# Patient Record
Sex: FEMALE | Race: WHITE | Employment: OTHER
[De-identification: names, ages, dates, MRNs, and addresses within clinical notes are randomized per-mention and may not be internally consistent; named-entity substitution may affect disease eponyms.]

---

## 2022-03-19 PROBLEM — N28.1 RENAL CYST: Status: ACTIVE | Noted: 2018-06-04

## 2023-07-20 ENCOUNTER — HOSPITAL ENCOUNTER (OUTPATIENT)
Facility: HOSPITAL | Age: 83
Setting detail: RECURRING SERIES
Discharge: HOME | End: 2023-07-23
Payer: MEDICARE

## 2023-07-20 PROCEDURE — 97110 THERAPEUTIC EXERCISES: CPT

## 2023-07-20 PROCEDURE — 97535 SELF CARE MNGMENT TRAINING: CPT

## 2023-07-20 PROCEDURE — 97162 PT EVAL MOD COMPLEX 30 MIN: CPT

## 2023-07-20 NOTE — THERAPY EVALUATION
improve quality of life and return patient to maximum level of available prior function. OBJECTIVE:  ANKLE:  Presenting in Left lace-up AFO brace and running shoes. Ankle: Non-tender to palpation. AROM:   DF: L: 15 deg, R: 7 deg  PF: L: 52 deg, R: 56 deg  INV: L: 15 deg, R: 16 deg  EV: L: 10 deg, R: 8 deg    Strength MMT   INV: L: 5/5, R: 5/5  EV: L: 5/5, R: 5/5  PF, 5/5 global  DF: 5/5 global    SHOULDER:  FHRS, kyphotic upper t/s. Non-tender to palpation  AROM:  Flexion: WFL bilaterally  ABD: L: \"catch\" in shoulder + Painful Arc, 120 deg, R: 165 deg  MALLIKA: T3 bilaterally  FIR: T6 bilaterally    Strength MMT  Flexion: L: 4+/5, R: 4-/5  ABD: L: 3/5 P!, R: 4-/5  ER: L: 4-/5, R: 4+/5  IR: L: 5/5, R: 5/5    Evaluation Complexity:  History:  MEDIUM  Complexity : 1-2 comorbidities / personal factors will impact the outcome/ POC ; Examination:  HIGH Complexity : 4+ Standardized tests and measures addressing body structure, function, activity limitation and / or participation in recreation  ;Presentation:  MEDIUM Complexity : Evolving with changing characteristics  ; Clinical Decision Making:  MEDIUM Complexity : FOTO score of 26-74 FOTO score = an established functional score where 100 = no disability  Overall Complexity Rating: MEDIUM  Problem List: pain affecting function, decrease ROM, decrease strength, impaired gait/balance, decrease ADL/functional abilities, and decrease activity tolerance   Treatment Plan may include any combination of the followin Therapeutic Exercise, 46824 Neuromuscular Re-Education, 67657 Manual Therapy, 50062 Therapeutic Activity, 30165 Self Care/Home Management, 28848 Electrical Stim unattended, [id number] Electrical Stim attended, 99484 Vasopneumatic Device, [id number] Aquatic Therapy, [id number] Gait Training, [id number] Ultrasound, [id number] Mechanical Traction, [id number] Canalith Repositioning, [address] Orthotic Management and Training, and Other:   Patient / Family readiness to learn indicated by: asking

## 2023-07-20 NOTE — PROGRESS NOTES
PHYSICAL THERAPY - DAILY TREATMENT NOTE    Patient Name: Naz Rollins    Date: 2023    : 1940  Insurance: Payor: Inis Bar / Plan: Inis Bar / Product Type: *No Product type* /      Patient  verified YES   Visit #   Current / Total 1 8   Time   In / Out 1:40 2:20   Pain   In / Out 0-3 0-3   Subjective Functional Status/Changes: SEE EVALUATION     TREATMENT AREA =  M79.672  LEFT FOOT PAIN  and M25.512  LEFT SHOULDER PAIN     OBJECTIVE        Therapeutic Procedures: Tx Min Billable or 1:1 Min (if diff from Tx Min) Procedure, Rationale, Specifics   10  42720 Therapeutic Exercise (timed):  increase ROM, strength, coordination, balance, and proprioception to improve patient's ability to progress to PLOF and address remaining functional goals. (see flow sheet as applicable)     Details if applicable:       15  78164 Self Care/Home Management (timed):  improve patient knowledge and understanding of pain reducing techniques, positioning, diagnosis/prognosis, and physical therapy expectations, procedures and progression  to improve patient's ability to progress to PLOF and address remaining functional goals.   (see flow sheet as applicable)     Details if applicable:            Details if applicable:            Details if applicable:            Details if applicable:     25 36 [facility] Totals Reminder: bill using total billable min of TIMED therapeutic procedures (example: do not include dry needle or estim unattended, both untimed codes, in totals to left)  8-22 min = 1 unit; 23-37 min = 2 units; 38-52 min = 3 units; 53-67 min = 4 units; 68-82 min = 5 units   Total Total     [x]  Patient Education billed concurrently with other procedures   [x] Review HEP    [] Progressed/Changed HEP, detail:    [] Other detail:       Objective Information/Functional Measures/Assessment    SEE EVALUATION    Patient will benefit from skilled PT services to address functional deficits and attain remaining goals as

## 2023-08-03 ENCOUNTER — HOSPITAL ENCOUNTER (OUTPATIENT)
Facility: HOSPITAL | Age: 83
Setting detail: RECURRING SERIES
Discharge: HOME | End: 2023-08-06
Payer: MEDICARE

## 2023-08-03 PROCEDURE — 97110 THERAPEUTIC EXERCISES: CPT

## 2023-08-03 NOTE — PROGRESS NOTES
PHYSICAL THERAPY - DAILY TREATMENT NOTE    Patient Name: Michelle Bagley    Date: 8/3/2023    : 1940  Insurance: Payor: Kailey Alaniz / Plan: Kailey Alaniz / Product Type: *No Product type* /      Patient  verified YES   Visit #   Current / Total 2 8   Time   In / Out 10:20 11:00   Pain   In / Out 3-4 2-3   Subjective Functional Status/Changes: Says she broght her ankle brace just in case. Says the left shoulder is a 3-4/10. The ankle is feeling ok. She will try to mow the grass later today and see how it goes. TREATMENT AREA =  Left shoulder pain [M25.512]  Left foot pain [M79.672]    OBJECTIVE        Therapeutic Procedures: Tx Min Billable or 1:1 Min (if diff from Tx Min) Procedure, Rationale, Specifics   40  26725 Therapeutic Exercise (timed):  increase ROM, strength, coordination, balance, and proprioception to improve patient's ability to progress to PLOF and address remaining functional goals. (see flow sheet as applicable)     Details if applicable:              Details if applicable:            Details if applicable:            Details if applicable:            Details if applicable:     36  [facility] Totals Reminder: bill using total billable min of TIMED therapeutic procedures (example: do not include dry needle or estim unattended, both untimed codes, in totals to left)  8-22 min = 1 unit; 23-37 min = 2 units; 38-52 min = 3 units; 53-67 min = 4 units; 68-82 min = 5 units   Total Total     [x]  Patient Education billed concurrently with other procedures   [x] Review HEP    [] Progressed/Changed HEP, detail:    [] Other detail:   Access Code: [email or web address]  URL: [email or web address]  Date: 2023  Prepared by: Qasim Anderson    Exercises  - Standing Heel Raise with Support  - 1 x daily - 7 x weekly - 3 sets - 10 reps  - Standing Toe Raises at Chair  - 1 x daily - 7 x weekly - 3 sets - 10 reps  - Gastroc Stretch on Wall  - 1 x daily - 7 x weekly - 3 sets - 1 reps

## 2023-08-08 ENCOUNTER — HOSPITAL ENCOUNTER (OUTPATIENT)
Facility: HOSPITAL | Age: 83
Setting detail: RECURRING SERIES
Discharge: HOME | End: 2023-08-11
Payer: MEDICARE

## 2023-08-08 PROCEDURE — 97110 THERAPEUTIC EXERCISES: CPT

## 2023-08-08 NOTE — PROGRESS NOTES
PHYSICAL THERAPY - DAILY TREATMENT NOTE    Patient Name: Elvin Knott    Date: 2023    : 1940  Insurance: Payor: Danika Villarreal / Plan: Danika Villarreal / Product Type: *No Product type* /      Patient  verified YES   Visit #   Current / Total 3 8   Time   In / Out 12:20 1:00   Pain   In / Out 2 0   Subjective Functional Status/Changes: Says she did not get to do the HEP bc she had 3 funerals last week. TREATMENT AREA =  Left shoulder pain [M25.512]  Left foot pain [M79.672]    OBJECTIVE        Therapeutic Procedures: Tx Min Billable or 1:1 Min (if diff from Tx Min) Procedure, Rationale, Specifics   40  77390 Therapeutic Exercise (timed):  increase ROM, strength, coordination, balance, and proprioception to improve patient's ability to progress to PLOF and address remaining functional goals. (see flow sheet as applicable)     Details if applicable:              Details if applicable:            Details if applicable:            Details if applicable:            Details if applicable:     36  [facility] Totals Reminder: bill using total billable min of TIMED therapeutic procedures (example: do not include dry needle or estim unattended, both untimed codes, in totals to left)  8-22 min = 1 unit; 23-37 min = 2 units; 38-52 min = 3 units; 53-67 min = 4 units; 68-82 min = 5 units   Total Total     [x]  Patient Education billed concurrently with other procedures   [x] Review HEP    [] Progressed/Changed HEP, detail:    [] Other detail:   Access Code: [email or web address]  URL: [email or web address]  Date: 2023  Prepared by: Jah Bonilla    Objective Information/Functional Measures/Assessment    Pt taken through continuation of shoulder and ankle drills without complication today. Added loads without complication.      Patient will continue to benefit from skilled PT services to modify and progress therapeutic interventions, analyze and address functional mobility deficits, analyze

## 2023-08-10 ENCOUNTER — HOSPITAL ENCOUNTER (OUTPATIENT)
Facility: HOSPITAL | Age: 83
Setting detail: RECURRING SERIES
Discharge: HOME | End: 2023-08-13
Payer: MEDICARE

## 2023-08-10 PROCEDURE — 97110 THERAPEUTIC EXERCISES: CPT

## 2023-08-10 NOTE — PROGRESS NOTES
PHYSICAL THERAPY - DAILY TREATMENT NOTE    Patient Name: Bin Epps    Date: 8/10/2023    : 1940  Insurance: Payor: Raudelor Parcel / Plan: Lenor Parcel / Product Type: *No Product type* /      Patient  verified YES   Visit #   Current / Total 4 8   Time   In / Out 1:00 1:30   Pain   In / Out 0 0   Subjective Functional Status/Changes: Says she weeded yesterday and she felt good. Shoulder did not stop her from doing anything. Denies post session soreness or night pain. TREATMENT AREA =  Left shoulder pain [M25.512]  Left foot pain [M79.672]    OBJECTIVE        Therapeutic Procedures: Tx Min Billable or 1:1 Min (if diff from Tx Min) Procedure, Rationale, Specifics   30  20132 Therapeutic Exercise (timed):  increase ROM, strength, coordination, balance, and proprioception to improve patient's ability to progress to PLOF and address remaining functional goals. (see flow sheet as applicable)     Details if applicable:              Details if applicable:            Details if applicable:            Details if applicable:            Details if applicable:     27  [facility] Totals Reminder: bill using total billable min of TIMED therapeutic procedures (example: do not include dry needle or estim unattended, both untimed codes, in totals to left)  8-22 min = 1 unit; 23-37 min = 2 units; 38-52 min = 3 units; 53-67 min = 4 units; 68-82 min = 5 units   Total Total     [x]  Patient Education billed concurrently with other procedures   [x] Review HEP    [] Progressed/Changed HEP, detail:    [] Other detail:   Access Code: [email or web address]  URL: [email or web address]  Date: 2023  Prepared by: Sheila Curiel    Objective Information/Functional Measures/Assessment    Continued to progress resistance in clinic without complication.      Patient will continue to benefit from skilled PT services to modify and progress therapeutic interventions, analyze and address functional mobility

## 2023-08-15 ENCOUNTER — HOSPITAL ENCOUNTER (OUTPATIENT)
Facility: HOSPITAL | Age: 83
Setting detail: RECURRING SERIES
Discharge: HOME | End: 2023-08-18
Payer: MEDICARE

## 2023-08-15 PROCEDURE — 97140 MANUAL THERAPY 1/> REGIONS: CPT

## 2023-08-15 PROCEDURE — 97110 THERAPEUTIC EXERCISES: CPT

## 2023-08-15 NOTE — PROGRESS NOTES
Review HEP    [] Progressed/Changed HEP, detail:    [] Other detail:   Access Code: [email or web address]  URL: [email or web address]  Date: 08/03/2023  Prepared by: Brie Herrera    Objective Information/Functional Measures/Assessment    Did not add any drills or loads today. Pt denies complications with her home chores and ADLs at this time. We are up for PN NV and will address pt's goals and status. Patient will continue to benefit from skilled PT services to modify and progress therapeutic interventions, analyze and address functional mobility deficits, analyze and address ROM deficits, analyze and address strength deficits, analyze and address soft tissue restrictions, analyze and cue for proper movement patterns, and analyze and modify for postural abnormalities to address functional deficits and attain remaining goals. Progress toward goals / Updated goals:  []  See Progress Note/Recertification    Short Term Goals: To be accomplished in 2 weeks  Pt to report adherence and demonstrate compliance with basic HEP to allow progress between visits  Status at last Eval: Initiated  Current Status: Expanded (8/3/23)  Goal Met?  -  2. Pt to report SHOULDER pain <3/10 at worst to allow improved function and QOL  Status at last Eval: 5/10 with reaching  Current Status: -  Goal Met?  -  3. Pt to report no need for the ankle brace in most activity without increase in pains to ANKLE to allow improved healing and QOL   Status at last Eval: Using lace-up brace and some discomfort without it  Current Status: -  Goal Met?  -     Long Term Goals: To be accomplished in 4 weeks  Pt to improve FOTO score to 68/100 indicating improved function in daily tasks  Status at last Eval: 62/100  Current Status: -  Goal Met?  -  2. Pt to indicate a Global Rating Of Change (GROC) of 4+ indicating \"moderately better\"  Status at last Eval: n/a  Current Status: -  Goal Met?  -  3.  Pt to demonstrate Left SHOULDER ABD  AROM

## 2023-08-17 ENCOUNTER — HOSPITAL ENCOUNTER (OUTPATIENT)
Facility: HOSPITAL | Age: 83
Setting detail: RECURRING SERIES
Discharge: HOME | End: 2023-08-20
Payer: MEDICARE

## 2023-08-17 PROCEDURE — 97535 SELF CARE MNGMENT TRAINING: CPT

## 2023-08-17 PROCEDURE — 97140 MANUAL THERAPY 1/> REGIONS: CPT

## 2023-08-17 PROCEDURE — 97110 THERAPEUTIC EXERCISES: CPT

## 2023-08-17 NOTE — PROGRESS NOTES
goals:  []  See Progress Note/Recertification    Short Term Goals: To be accomplished in 2 weeks  Pt to report adherence and demonstrate compliance with basic HEP to allow progress between visits  Status at last Eval: Initiated  Current Status: Expanded (8/3/23)  Goal Met? MET  2. Pt to report SHOULDER pain <3/10 at worst to allow improved function and QOL  Status at last Eval: 5/10 with reaching  Current Status: 3-4/10 with reaching  Goal Met? Progressing (8/17/23)  3. Pt to report no need for the ankle brace in most activity without increase in pains to ANKLE to allow improved healing and QOL   Status at last Eval: Using lace-up brace and some discomfort without it  Current Status: Not using in home, using for community activity, or mowing the grass  Goal Met? Progressing well (8/17/23)     Long Term Goals: To be accomplished in 4 weeks  Pt to improve FOTO score to 68/100 indicating improved function in daily tasks  Status at last Eval: 62/100 (for foot, yet this is not patient's primary concern. DC this FOOT FOTO)  Current Status: DC this Foot FOTO  Goal Met? DC  2. Pt to indicate a Global Rating Of Change (GROC) of 4+ indicating \"moderately better\"  Status at last Eval: n/a  Current Status: -  Goal Met?  -  3. Pt to demonstrate Left SHOULDER ABD  AROM to 145 deg without painful arc to indicate return to PLOF  Status at last Eval: 120 deg ABD Painful Arc and \"catch\"  Current Status: No Change  Goal Met? Not Met (8/17/23)  4.  Pt to report improvement in SHOULDER FOTO to 69/100 to indicate improved function  Status at PN (8/17/23): 61/100  Current Status:  Goals Met:    PLAN  YES Continue plan of care  [x]  Upgrade activities as tolerated  []  Discharge due to :  []  Other:    Zion Ramos PT DPT, OCS   8/17/2023    1:02 PM    Future Appointments   Date Time Provider [address]  [facility]   8/21/2023 11:00 AM Zion Ramos PT [address] [facility]   8/24/2023 11:00 AM Zion Ramos PT [address] [facility]

## 2023-08-17 NOTE — THERAPY RECERTIFICATION
parties agreed to [address] [employer] [address]. Short Term Goals: To be accomplished in 2 weeks  Pt to report adherence and demonstrate compliance with basic HEP to allow progress between visits  Status at last Eval: Initiated  Current Status: Expanded (8/3/23)  Goal Met? MET  2. Pt to report SHOULDER pain <3/10 at worst to allow improved function and QOL  Status at last Eval: 5/10 with reaching  Current Status: 3-4/10 with reaching  Goal Met? Progressing (8/17/23)  3. Pt to report no need for the ankle brace in most activity without increase in pains to ANKLE to allow improved healing and QOL   Status at last Eval: Using lace-up brace and some discomfort without it  Current Status: Not using in home, using for community activity, or mowing the grass  Goal Met? Progressing well (8/17/23)     Long Term Goals: To be accomplished in 4 weeks  Pt to improve FOTO score to 68/100 indicating improved function in daily tasks  Status at last Eval: 62/100 (for foot, yet this is not patient's primary concern. DC this FOOT FOTO)  Current Status: DC this Foot FOTO  Goal Met? DC  2. Pt to indicate a Global Rating Of Change (GROC) of 4+ indicating \"moderately better\"  Status at last Eval: n/a  Current Status: -  Goal Met?  -  3. Pt to demonstrate Left SHOULDER ABD  AROM to 145 deg without painful arc to indicate return to PLOF  Status at last Eval: 120 deg ABD Painful Arc and \"catch\"  Current Status: No Change  Goal Met? Not Met (8/17/23)  4. Pt to report improvement in SHOULDER FOTO to 69/100 to indicate improved function  Status at PN (8/17/23): 61/100  Current Status:  Goals Met:    RECOMMENDATIONS  Continue current progressive POC as above  6 more visits    Certification Period: 7/20/23 - 10/18/23  Reporting Period (date from last assessment to current assessment): 7/20/23 - 8/17/23    If you have any questions/comments please contact us directly at ([phone]. Thank you for allowing us to assist in the care of your patient.     Shantal Turcios

## 2023-08-21 ENCOUNTER — APPOINTMENT (OUTPATIENT)
Facility: HOSPITAL | Age: 83
End: 2023-08-21
Payer: MEDICARE

## 2023-08-22 ENCOUNTER — HOSPITAL ENCOUNTER (OUTPATIENT)
Facility: HOSPITAL | Age: 83
Setting detail: RECURRING SERIES
End: 2023-08-22
Payer: MEDICARE

## 2023-08-24 ENCOUNTER — HOSPITAL ENCOUNTER (OUTPATIENT)
Facility: HOSPITAL | Age: 83
Setting detail: RECURRING SERIES
Discharge: HOME | End: 2023-08-27
Payer: MEDICARE

## 2023-08-24 PROCEDURE — 97530 THERAPEUTIC ACTIVITIES: CPT

## 2023-08-24 PROCEDURE — 97110 THERAPEUTIC EXERCISES: CPT

## 2023-08-24 NOTE — PROGRESS NOTES
PHYSICAL THERAPY - DAILY TREATMENT NOTE    Patient Name: Crystal Marr    Date: 2023    : 1940  Insurance: Payor: Maribel Wade / Plan: Maribel Wade / Product Type: *No Product type* /      Patient  verified YES   Visit #   Current / Total 7 12   Time   In / Out 11:00 11:40   Pain   In / Out 0 0   Subjective Functional Status/Changes:   Says she worked in the yard yesterday and she feels good today. Says still hurts to reach up and out. TREATMENT AREA =  Left shoulder pain [M25.512]  Left foot pain [M79.672]    OBJECTIVE    Therapeutic Procedures: Tx Min Billable or 1:1 Min (if diff from Tx Min) Procedure, Rationale, Specifics   30  60053 Therapeutic Exercise (timed):  increase ROM, strength, coordination, balance, and proprioception to improve patient's ability to progress to PLOF and address remaining functional goals. (see flow sheet as applicable)     Details if applicable:          83023 Manual Therapy (timed):  decrease pain, increase ROM, increase tissue extensibility, and increase postural awareness to improve patient's ability to progress to PLOF and address remaining functional goals. The manual therapy interventions were performed at a separate and distinct time from the therapeutic activities interventions . (see flow sheet as applicable)       Details if applicable:  Supine: Left shoulder DTM holds to infraspinatus x5. Left shoulder Prom ABD   10   00500 Therapeutic Activity (timed):  use of dynamic activities replicating functional movements to increase ROM, strength, coordination, balance, and proprioception in order to improve patient's ability to progress to PLOF and address remaining functional goals.   (see flow sheet as applicable)     Details if applicable:            Details if applicable:            Details if applicable:     36  [facility] Totals Reminder: bill using total billable min of TIMED therapeutic procedures (example: do not include dry needle or estim

## 2023-08-29 ENCOUNTER — HOSPITAL ENCOUNTER (OUTPATIENT)
Facility: HOSPITAL | Age: 83
Setting detail: RECURRING SERIES
Discharge: HOME | End: 2023-09-01
Payer: MEDICARE

## 2023-08-29 PROCEDURE — 97110 THERAPEUTIC EXERCISES: CPT

## 2023-08-29 PROCEDURE — 97530 THERAPEUTIC ACTIVITIES: CPT

## 2023-08-29 NOTE — PROGRESS NOTES
PHYSICAL THERAPY - DAILY TREATMENT NOTE    Patient Name: Getachew Wagner    Date: 2023    : 1940  Insurance: Payor: Ira Jarrell / Plan: Ira Jarrell / Product Type: *No Product type* /      Patient  verified YES   Visit #   Current / Total 8 12   Time   In / Out 4:20 4:58   Pain   In / Out 0 0   Subjective Functional Status/Changes:   Says it feels good if she doesn't rate. TREATMENT AREA =  Left shoulder pain [M25.512]  Left foot pain [M79.672]    OBJECTIVE    Therapeutic Procedures: Tx Min Billable or 1:1 Min (if diff from Tx Min) Procedure, Rationale, Specifics   28  02173 Therapeutic Exercise (timed):  increase ROM, strength, coordination, balance, and proprioception to improve patient's ability to progress to PLOF and address remaining functional goals. (see flow sheet as applicable)     Details if applicable:          51831 Manual Therapy (timed):  decrease pain, increase ROM, increase tissue extensibility, and increase postural awareness to improve patient's ability to progress to PLOF and address remaining functional goals. The manual therapy interventions were performed at a separate and distinct time from the therapeutic activities interventions . (see flow sheet as applicable)       Details if applicable:  Supine: Left shoulder DTM holds to infraspinatus x5. Left shoulder Prom ABD   10   48800 Therapeutic Activity (timed):  use of dynamic activities replicating functional movements to increase ROM, strength, coordination, balance, and proprioception in order to improve patient's ability to progress to PLOF and address remaining functional goals.   (see flow sheet as applicable)     Details if applicable:            Details if applicable:            Details if applicable:     45  [facility] Totals Reminder: bill using total billable min of TIMED therapeutic procedures (example: do not include dry needle or estim unattended, both untimed codes, in totals to left)  8-22 min = 1

## 2023-08-31 ENCOUNTER — HOSPITAL ENCOUNTER (OUTPATIENT)
Facility: HOSPITAL | Age: 83
Setting detail: RECURRING SERIES
End: 2023-08-31
Payer: MEDICARE

## 2023-08-31 PROCEDURE — 97530 THERAPEUTIC ACTIVITIES: CPT

## 2023-08-31 PROCEDURE — 97110 THERAPEUTIC EXERCISES: CPT

## 2023-08-31 NOTE — PROGRESS NOTES
PHYSICAL THERAPY - DAILY TREATMENT NOTE    Patient Name: Dylan Escobar    Date: 2023    : 1940  Insurance: Payor: Shady Blanchard / Plan: Shady Blanchard / Product Type: *No Product type* /      Patient  verified YES   Visit #   Current / Total 9 12   Time   In / Out 11:00 11:38   Pain   In / Out 0 0   Subjective Functional Status/Changes:   Says 4/10 P! At painful Arc. TREATMENT AREA =  Left shoulder pain [M25.512]  Left foot pain [M79.672]    OBJECTIVE    Therapeutic Procedures: Tx Min Billable or 1:1 Min (if diff from Tx Min) Procedure, Rationale, Specifics   28  85366 Therapeutic Exercise (timed):  increase ROM, strength, coordination, balance, and proprioception to improve patient's ability to progress to PLOF and address remaining functional goals. (see flow sheet as applicable)     Details if applicable:          10940 Manual Therapy (timed):  decrease pain, increase ROM, increase tissue extensibility, and increase postural awareness to improve patient's ability to progress to PLOF and address remaining functional goals. The manual therapy interventions were performed at a separate and distinct time from the therapeutic activities interventions . (see flow sheet as applicable)       Details if applicable:  Supine: Left shoulder DTM holds to infraspinatus x5. Left shoulder Prom ABD   10   42950 Therapeutic Activity (timed):  use of dynamic activities replicating functional movements to increase ROM, strength, coordination, balance, and proprioception in order to improve patient's ability to progress to PLOF and address remaining functional goals.   (see flow sheet as applicable)     Details if applicable:            Details if applicable:            Details if applicable:     45  [facility] Totals Reminder: bill using total billable min of TIMED therapeutic procedures (example: do not include dry needle or estim unattended, both untimed codes, in totals to left)  8-22 min = 1 unit; 23-37 min = 2 units; 38-52 min = 3 units; 53-67 min = 4 units; 68-82 min = 5 units   Total Total     [x]  Patient Education billed concurrently with other procedures   [x] Review HEP    [] Progressed/Changed HEP, detail:    [] Other detail:   Access Code: [email or web address]  URL: [email or web address]  Date: 08/03/2023  Prepared by: Zetta Felty    Objective Information/Functional Measures/Assessment    Pt's performance of lateral shoulder raise is improved with less aberrant movement patterns. 4/10 painis still reported however. Completed RTC drills and scapular drills. Continue POC for 3 more visits. Patient will continue to benefit from skilled PT services to modify and progress therapeutic interventions, analyze and address functional mobility deficits, analyze and address ROM deficits, analyze and address strength deficits, analyze and address soft tissue restrictions, analyze and cue for proper movement patterns, and analyze and modify for postural abnormalities to address functional deficits and attain remaining goals. Progress toward goals / Updated goals:  []  See Progress Note/Recertification    Short Term Goals: To be accomplished in 2 weeks  Pt to report adherence and demonstrate compliance with basic HEP to allow progress between visits  Status at last Eval: Initiated  Current Status: Expanded (8/3/23)  Goal Met? MET  2. Pt to report SHOULDER pain <3/10 at worst to allow improved function and QOL  Status at last Eval: 5/10 with reaching  Current Status: 3-4/10 with reaching  Goal Met? Progressing (8/17/23)  3. Pt to report no need for the ankle brace in most activity without increase in pains to ANKLE to allow improved healing and QOL   Status at last Eval: Using lace-up brace and some discomfort without it  Current Status: Not using in home, using for community activity, or mowing the grass  Goal Met? Progressing well (8/17/23)     Long Term Goals:  To be accomplished in 4

## 2023-09-05 ENCOUNTER — HOSPITAL ENCOUNTER (OUTPATIENT)
Facility: HOSPITAL | Age: 83
Setting detail: RECURRING SERIES
Discharge: HOME | End: 2023-09-08
Payer: MEDICARE

## 2023-09-05 PROCEDURE — 97110 THERAPEUTIC EXERCISES: CPT

## 2023-09-05 PROCEDURE — 97140 MANUAL THERAPY 1/> REGIONS: CPT

## 2023-09-05 PROCEDURE — 97530 THERAPEUTIC ACTIVITIES: CPT

## 2023-09-05 NOTE — PROGRESS NOTES
PHYSICAL THERAPY - DAILY TREATMENT NOTE    Patient Name: Karen Gaxiola    Date: 2023    : 1940  Insurance: Payor: Bharat Khan / Plan: Bharat Khan / Product Type: *No Product type* /      Patient  verified YES   Visit #   Current / Total 10 12   Time   In / Out 10:20 10:58   Pain   In / Out 0-4 0   Subjective Functional Status/Changes:   Says still reaching out is troublesome. Pain is more in the back of the shoulder today. None at rest, only with active (abduction). TREATMENT AREA =  Left shoulder pain [M25.512]  Left foot pain [M79.672]    OBJECTIVE    Therapeutic Procedures: Tx Min Billable or 1:1 Min (if diff from Tx Min) Procedure, Rationale, Specifics   18  [id number] Therapeutic Exercise (timed):  increase ROM, strength, coordination, balance, and proprioception to improve patient's ability to progress to PLOF and address remaining functional goals. (see flow sheet as applicable)     Details if applicable:       10   38325 Manual Therapy (timed):  decrease pain, increase ROM, increase tissue extensibility, and increase postural awareness to improve patient's ability to progress to PLOF and address remaining functional goals. The manual therapy interventions were performed at a separate and distinct time from the therapeutic activities interventions . (see flow sheet as applicable)       Details if applicable:  Supine: Left shoulder DTM holds to infraspinatus x5. Left shoulder Prom ABD   10   32214 Therapeutic Activity (timed):  use of dynamic activities replicating functional movements to increase ROM, strength, coordination, balance, and proprioception in order to improve patient's ability to progress to PLOF and address remaining functional goals.   (see flow sheet as applicable)     Details if applicable:            Details if applicable:            Details if applicable:     45  [address] Reminder: bill using total billable min of TIMED therapeutic procedures (example: do not

## 2023-09-07 ENCOUNTER — APPOINTMENT (OUTPATIENT)
Facility: HOSPITAL | Age: 83
End: 2023-09-07
Payer: MEDICARE

## 2023-09-12 ENCOUNTER — HOSPITAL ENCOUNTER (OUTPATIENT)
Facility: HOSPITAL | Age: 83
Setting detail: RECURRING SERIES
Discharge: HOME | End: 2023-09-15
Payer: MEDICARE

## 2023-09-12 PROCEDURE — 97530 THERAPEUTIC ACTIVITIES: CPT

## 2023-09-12 PROCEDURE — 97140 MANUAL THERAPY 1/> REGIONS: CPT

## 2023-09-12 PROCEDURE — 97110 THERAPEUTIC EXERCISES: CPT

## 2023-09-12 NOTE — PROGRESS NOTES
PHYSICAL THERAPY - DAILY TREATMENT NOTE    Patient Name: Alberto Mclaughlin    Date: 2023    : 1940  Insurance: Payor: Carolina Mccray / Plan: Carolina Mccray / Product Type: *No Product type* /      Patient  verified YES   Visit #   Current / Total 11 12   Time   In / Out 2:20 2:58   Pain   In / Out 0-2 0   Subjective Functional Status/Changes:   Says she lifted a watermelon and that was too heavy and she could feel it. TREATMENT AREA =  Left shoulder pain [M25.512]  Left foot pain [M79.672]    OBJECTIVE    Therapeutic Procedures: Tx Min Billable or 1:1 Min (if diff from Tx Min) Procedure, Rationale, Specifics   18  39816 Therapeutic Exercise (timed):  increase ROM, strength, coordination, balance, and proprioception to improve patient's ability to progress to PLOF and address remaining functional goals. (see flow sheet as applicable)     Details if applicable:       10   88410 Manual Therapy (timed):  decrease pain, increase ROM, increase tissue extensibility, and increase postural awareness to improve patient's ability to progress to PLOF and address remaining functional goals. The manual therapy interventions were performed at a separate and distinct time from the therapeutic activities interventions . (see flow sheet as applicable)       Details if applicable:  Supine: Left shoulder DTM holds to infraspinatus x5. Left shoulder Prom ABD   10   50731 Therapeutic Activity (timed):  use of dynamic activities replicating functional movements to increase ROM, strength, coordination, balance, and proprioception in order to improve patient's ability to progress to PLOF and address remaining functional goals.   (see flow sheet as applicable)     Details if applicable:            Details if applicable:            Details if applicable:     45  [facility] Totals Reminder: bill using total billable min of TIMED therapeutic procedures (example: do not include dry needle or estim unattended, both untimed

## 2023-09-14 ENCOUNTER — HOSPITAL ENCOUNTER (OUTPATIENT)
Facility: HOSPITAL | Age: 83
Setting detail: RECURRING SERIES
Discharge: HOME | End: 2023-09-17
Payer: MEDICARE

## 2023-09-14 PROCEDURE — 97535 SELF CARE MNGMENT TRAINING: CPT

## 2023-09-14 PROCEDURE — 97110 THERAPEUTIC EXERCISES: CPT

## 2023-09-14 PROCEDURE — 97530 THERAPEUTIC ACTIVITIES: CPT

## 2023-09-14 NOTE — THERAPY DISCHARGE
PT DISCHARGE DAILY NOTE AND SUMMARY    Date:2023  Patient name: Alphonso Fields Start of Care: 23   Referral source: Yunior Pozo MD : 1940   Medical/Treatment Diagnosis: Left shoulder pain [M25.512]  Left foot pain [M79.672] M79.672  LEFT FOOT PAIN  and M25.512  LEFT SHOULDER PAIN  Onset Date:2023     Prior Hospitalization: see medical history Provider#: [id number]   Comorbidities: Arthritis  Prior Level of Function:Was gardening, walking, pulling, reaching  Insurance: Payor: Sarita Ashley / Plan: Sarita Ashley / Product Type: *No Product type* /      Visits from Start of Care: 12  Missed Visits: 0    Reporting Period : 23 to 23    Patient  verified YES    Visit #   Current / Total 12 12   Time   In / Out 12:20 1:00   Pain   In / Out 0 0   Subjective Functional Status/Changes: \"No pain, but it doesn't exactly feel good to raise the arm out to the side. If I think it will hurt, I typically just avoid it. \"     Changes to:  Meds, Allergies, Med Hx, Sx Hx? If yes, update Summary List NO *see subj/obj for changes prn*       TREATMENT AREA =  Left shoulder pain [M25.512]  Left foot pain [M79.672]    OBJECTIVE         Therapeutic Procedures: Tx Min Billable or 1:1 Min (if diff from Tx Min) Procedure, Rationale, Specifics   20  *[id number] Self Care/Home Management (timed):  improve patient knowledge and understanding of pain reducing techniques, positioning, activity modification, and DC planning, to improve patient's ability to progress after DC in HEP and return towards PLOF  (see flow sheet as applicable)     Details if applicable:     10  22183 Therapeutic Exercise (timed):  increase ROM, strength, coordination, balance, and proprioception to improve patient's ability to progress to PLOF and address remaining functional goals.  (see flow sheet as applicable)     Details if applicable:     10  10933 Therapeutic Activity (timed):  use of dynamic activities replicating functional

## 2024-07-12 ENCOUNTER — TELEPHONE (OUTPATIENT)
Facility: HOSPITAL | Age: 84
End: 2024-07-12